# Patient Record
Sex: FEMALE | Race: WHITE | URBAN - METROPOLITAN AREA
[De-identification: names, ages, dates, MRNs, and addresses within clinical notes are randomized per-mention and may not be internally consistent; named-entity substitution may affect disease eponyms.]

---

## 2022-06-27 RX ORDER — SULFAMETHOXAZOLE AND TRIMETHOPRIM 400; 80 MG/1; MG/1
TABLET ORAL
COMMUNITY
End: 2022-10-16

## 2022-06-27 RX ORDER — ALPRAZOLAM 0.25 MG/1
TABLET ORAL
COMMUNITY
Start: 2021-12-20

## 2022-06-27 RX ORDER — FLUOXETINE HYDROCHLORIDE 40 MG/1
1 CAPSULE ORAL
COMMUNITY
End: 2022-11-04

## 2022-11-02 PROBLEM — F41.8 DEPRESSION WITH ANXIETY: Status: ACTIVE | Noted: 2022-11-02

## 2023-04-06 PROBLEM — M41.9 ACQUIRED SCOLIOSIS: Status: ACTIVE | Noted: 2023-04-06

## 2023-04-06 PROBLEM — N39.0 FREQUENT UTI: Status: ACTIVE | Noted: 2023-04-06

## 2023-04-06 PROBLEM — F32.A DEPRESSION: Status: ACTIVE | Noted: 2023-04-06

## 2023-04-06 PROBLEM — B00.1 RECURRENT COLD SORES: Status: ACTIVE | Noted: 2023-04-06

## 2023-05-17 ENCOUNTER — OFFICE VISIT (OUTPATIENT)
Dept: NEUROSURGERY | Age: 48
End: 2023-05-17
Payer: COMMERCIAL

## 2023-05-17 VITALS
OXYGEN SATURATION: 97 % | TEMPERATURE: 97.2 F | WEIGHT: 176 LBS | HEIGHT: 63 IN | SYSTOLIC BLOOD PRESSURE: 123 MMHG | DIASTOLIC BLOOD PRESSURE: 71 MMHG | HEART RATE: 79 BPM | BODY MASS INDEX: 31.18 KG/M2

## 2023-05-17 DIAGNOSIS — M54.50 CHRONIC MIDLINE LOW BACK PAIN WITHOUT SCIATICA: Primary | ICD-10-CM

## 2023-05-17 DIAGNOSIS — M41.9 SCOLIOSIS, UNSPECIFIED SCOLIOSIS TYPE, UNSPECIFIED SPINAL REGION: ICD-10-CM

## 2023-05-17 DIAGNOSIS — G89.29 CHRONIC MIDLINE LOW BACK PAIN WITHOUT SCIATICA: Primary | ICD-10-CM

## 2023-05-17 PROCEDURE — 99203 OFFICE O/P NEW LOW 30 MIN: CPT | Performed by: NURSE PRACTITIONER

## 2023-05-17 NOTE — PROGRESS NOTES
Lack of Transportation (Non-Medical): No   Physical Activity: Not on file   Stress: Not on file   Social Connections: Not on file   Intimate Partner Violence: Not on file   Housing Stability: Unknown    Unable to Pay for Housing in the Last Year: Not on file    Number of Places Lived in the Last Year: Not on file    Unstable Housing in the Last Year: No     Current Outpatient Medications   Medication Sig Dispense Refill    sulfamethoxazole-trimethoprim (BACTRIM DS;SEPTRA DS) 800-160 MG per tablet       FLUoxetine (PROZAC) 40 MG capsule Take 1 capsule by mouth daily 90 capsule 1    valACYclovir (VALTREX) 1 g tablet Take 1 tablet by mouth 2 times daily      FLUoxetine (PROZAC) 40 MG capsule TAKE 1 CAPSULE BY MOUTH EVERY DAY FOR 90 DAYS 90 capsule 0    ALPRAZolam (XANAX) 0.25 MG tablet 1 tablet Orally Twice a day for 30 days       No current facility-administered medications for this visit. Patient Active Problem List   Diagnosis    Depression with anxiety    Acquired scoliosis    Depression    Frequent UTI    Recurrent cold sores          ROS Review of Systems    Constitutional:                    No recent weight changes, fever, fatigue, sleep difficulties, loss of appetite   ENT/Mouth:  No hearing loss, No ringing in the ears, chronic sinus problem, nose bleeds,sore throat, voice change, hoarseness, swollen glands in neck, or difficulties with chewing and swallowing. Cardiovascular:  No chest pain/angina pectoris, palpitations, swelling of feet/ankles/hands, or calf pain while walking. Respiratory: No chronic or frequent coughs, spitting up blood, shortness of breath, No asthma, or wheezing.      Gastrointestinal: No a bdominal pain, heartburn, nausea, vomiting, constipation, or frequent diarrhea     Genitourinary: No frequent urination, burning or painful urination, or blood in urine     Musculoskeletal:   POS low back pain     Integument:   No rash/itching     Neurological:   Dizziness/vertigo, No

## 2023-05-18 ENCOUNTER — TELEMEDICINE (OUTPATIENT)
Dept: FAMILY MEDICINE CLINIC | Facility: CLINIC | Age: 48
End: 2023-05-18
Payer: COMMERCIAL

## 2023-05-18 DIAGNOSIS — F41.8 DEPRESSION WITH ANXIETY: Primary | ICD-10-CM

## 2023-05-18 DIAGNOSIS — M41.9 ACQUIRED SCOLIOSIS: ICD-10-CM

## 2023-05-18 PROCEDURE — G8427 DOCREV CUR MEDS BY ELIG CLIN: HCPCS | Performed by: FAMILY MEDICINE

## 2023-05-18 PROCEDURE — 99214 OFFICE O/P EST MOD 30 MIN: CPT | Performed by: FAMILY MEDICINE

## 2023-05-18 PROCEDURE — G8417 CALC BMI ABV UP PARAM F/U: HCPCS | Performed by: FAMILY MEDICINE

## 2023-05-18 PROCEDURE — 1036F TOBACCO NON-USER: CPT | Performed by: FAMILY MEDICINE

## 2023-05-18 RX ORDER — ALPRAZOLAM 0.5 MG/1
0.25 TABLET ORAL 2 TIMES DAILY PRN
Qty: 30 TABLET | Refills: 1 | Status: SHIPPED | OUTPATIENT
Start: 2023-05-18 | End: 2023-06-17

## 2023-05-18 RX ORDER — MELOXICAM 15 MG/1
15 TABLET ORAL DAILY PRN
Qty: 30 TABLET | Refills: 0 | Status: SHIPPED | OUTPATIENT
Start: 2023-05-18

## 2023-05-18 ASSESSMENT — PATIENT HEALTH QUESTIONNAIRE - PHQ9
8. MOVING OR SPEAKING SO SLOWLY THAT OTHER PEOPLE COULD HAVE NOTICED. OR THE OPPOSITE, BEING SO FIGETY OR RESTLESS THAT YOU HAVE BEEN MOVING AROUND A LOT MORE THAN USUAL: 0
4. FEELING TIRED OR HAVING LITTLE ENERGY: 0
1. LITTLE INTEREST OR PLEASURE IN DOING THINGS: 0
SUM OF ALL RESPONSES TO PHQ QUESTIONS 1-9: 0
6. FEELING BAD ABOUT YOURSELF - OR THAT YOU ARE A FAILURE OR HAVE LET YOURSELF OR YOUR FAMILY DOWN: 0
SUM OF ALL RESPONSES TO PHQ QUESTIONS 1-9: 0
SUM OF ALL RESPONSES TO PHQ9 QUESTIONS 1 & 2: 0
3. TROUBLE FALLING OR STAYING ASLEEP: 0
2. FEELING DOWN, DEPRESSED OR HOPELESS: 0
7. TROUBLE CONCENTRATING ON THINGS, SUCH AS READING THE NEWSPAPER OR WATCHING TELEVISION: 0
9. THOUGHTS THAT YOU WOULD BE BETTER OFF DEAD, OR OF HURTING YOURSELF: 0
5. POOR APPETITE OR OVEREATING: 0
SUM OF ALL RESPONSES TO PHQ QUESTIONS 1-9: 0
SUM OF ALL RESPONSES TO PHQ QUESTIONS 1-9: 0
10. IF YOU CHECKED OFF ANY PROBLEMS, HOW DIFFICULT HAVE THESE PROBLEMS MADE IT FOR YOU TO DO YOUR WORK, TAKE CARE OF THINGS AT HOME, OR GET ALONG WITH OTHER PEOPLE: 0

## 2023-05-18 ASSESSMENT — ENCOUNTER SYMPTOMS
DIARRHEA: 0
SHORTNESS OF BREATH: 0
VOMITING: 0
CONSTIPATION: 0
COUGH: 0

## 2023-05-18 NOTE — PROGRESS NOTES
Edith Adams (:  1975) is a 53 y.o. female,Established patient, here for evaluation of the following chief complaint(s):  Lower Back Pain (Has been getting worse of the past week, has been having pain for years. Wants to talk about this more.)         ASSESSMENT/PLAN:  1. Depression with anxiety  -     ALPRAZolam (XANAX) 0.5 MG tablet; Take 0.5 tablets by mouth 2 times daily as needed for Sleep for up to 30 days. Max Daily Amount: 0.5 mg, Disp-30 tablet, R-1Normal  2. Acquired scoliosis  -     meloxicam (MOBIC) 15 MG tablet; Take 1 tablet by mouth daily as needed for Pain, Disp-30 tablet, R-0Normal  Paperwork to be filled out next week. No follow-ups on file. Subjective   SUBJECTIVE/OBJECTIVE:  HPI  Chief Complaint   Patient presents with    Lower Back Pain     Has been getting worse of the past week, has been having pain for years. Wants to talk about this more. Referred to spine surgery for scoliosis. They referred her to physical therapy. Having more pain recently. Wondering what she could do about this pain. Taking advil 600mg, and 400mg about twice a day. Xanax was given in the past for anxiety, and she has not taken it in a while, but is out. Would like a refill. Needs note for work as she missed a few days due to back pain as standing is quite painful, and work is requiring me to fill out short term disability paperwork and her specialist who she saw for this refuses to do so. Review of Systems   Constitutional:  Negative for diaphoresis and unexpected weight change. HENT:  Negative for congestion. Eyes:  Negative for visual disturbance. Respiratory:  Negative for cough and shortness of breath. Cardiovascular:  Negative for chest pain. Gastrointestinal:  Negative for constipation, diarrhea and vomiting. Genitourinary:  Negative for dysuria. Neurological:  Negative for headaches.    Psychiatric/Behavioral:  Negative for dysphoric mood and sleep

## 2023-05-22 ENCOUNTER — TELEPHONE (OUTPATIENT)
Dept: NEUROSURGERY | Age: 48
End: 2023-05-22

## 2023-05-22 DIAGNOSIS — M41.9 SCOLIOSIS, UNSPECIFIED SCOLIOSIS TYPE, UNSPECIFIED SPINAL REGION: ICD-10-CM

## 2023-05-22 DIAGNOSIS — G89.29 CHRONIC MIDLINE LOW BACK PAIN WITHOUT SCIATICA: Primary | ICD-10-CM

## 2023-05-22 DIAGNOSIS — M54.50 CHRONIC MIDLINE LOW BACK PAIN WITHOUT SCIATICA: Primary | ICD-10-CM

## 2023-05-23 ENCOUNTER — HOSPITAL ENCOUNTER (OUTPATIENT)
Dept: PHYSICAL THERAPY | Age: 48
Setting detail: RECURRING SERIES
Discharge: HOME | End: 2023-05-26
Payer: COMMERCIAL

## 2023-05-23 PROCEDURE — 97161 PT EVAL LOW COMPLEX 20 MIN: CPT

## 2023-05-23 PROCEDURE — 97110 THERAPEUTIC EXERCISES: CPT

## 2023-05-23 ASSESSMENT — PAIN SCALES - GENERAL: PAINLEVEL_OUTOF10: 0

## 2023-05-23 NOTE — THERAPY EVALUATION
Nani Paul  : 1975  Primary: [address] (Commercial)  Secondary: [place] [employer]  [address]   [address]  [address] [place] [address] Way [address]  Phone: [phone]  Fax: [phone] Plan Frequency: 2 x week for 6 weeks         PT Visit Info:  Plan Frequency: 2 x week for 6 weeks  Total # of Visits to Date: 1      Visit Count:  1                OUTPATIENT PHYSICAL THERAPY:             OP NOTE TYPE: Initial Assessment 2023               Episode (Low Back Pain) Appt Desk         Treatment Diagnosis:  Generalized Muscle Weakness (M62.81)  Other Low Back Pain (M54.59)  Idiopathic scoliosis (M41.20)  Medical/Referring Diagnosis:  Chronic midline low back pain without sciatica [M54.50, G89.29]  Scoliosis, unspecified scoliosis type, unspecified spinal region [M41.9]  Referring Physician:  KELSY Ramirez CNP, MD Orders:  PT Eval and Treat   Return MD Appt:  TBD  Date of Onset:  Onset Date:  (Chronic)      Allergies:  Latex  Restrictions/Precautions:    Restrictions/Precautions: None        Medications Last Reviewed:  2023     SUBJECTIVE   History of Injury/Illness (Reason for Referral):  Patient presents with complaints of double scoliosis with complaints of stiffness, pain with standing to do dishes, or walking > 15 minutes. 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 Denies numbness/tingling, difficulty. Reports in the past feeling better after doing the cross . Saw a chiropractor once, sedentary lifestyle  Patient Stated Goal(s):  \"Eliminate back\"  Initial:     010 Post Session:     010  Past Medical History/Comorbidities:   Ms. Milan Sanchez  has no past medical history on file.   Ms. Milan Sanchez  has a past surgical history that includes  section () and

## 2023-05-26 ENCOUNTER — HOSPITAL ENCOUNTER (OUTPATIENT)
Dept: PHYSICAL THERAPY | Age: 48
Setting detail: RECURRING SERIES
End: 2023-05-26
Payer: COMMERCIAL

## 2023-05-26 NOTE — PROGRESS NOTES
Nani Paul  : 1975  Primary: [address] (Commercial)  Secondary: [place]  [address]   [address] [place] [address]  Phone: [phone]  Fax: [phone] Plan Frequency: 2 x week for 6 weeks    Plan of Care/Certification Expiration Date: 23      >PT Visit Info:  Plan Frequency: 2 x week for 6 weeks  Plan of Care/Certification Expiration Date: 23  Total # of Visits to Date: 1      Visit Count:  1    OUTPATIENT PHYSICAL THERAPY:OP NOTE TYPE: Treatment Note 2023       Episode  }Appt Desk             Treatment Diagnosis:  Generalized Muscle Weakness (M62.81)  Other Low Back Pain (M54.59)  Idiopathic scoliosis (M41.20)  Medical/Referring Diagnosis:  Chronic midline low back pain without sciatica [M54.50, G89.29]  Scoliosis, unspecified scoliosis type, unspecified spinal region [M41.9]  Referring Physician:  KELSY Ramirez CNP, MD Orders:  PT Eval and Treat   Date of Onset:  Onset Date:  (Chronic)     Allergies:   Latex  Restrictions/Precautions:  Restrictions/Precautions: None  No data recorded     Interventions Planned (Treatment may consist of any combination of the following):    Current Treatment Recommendations: Strengthening; ROM; ADL/Self-care training; Home exercise program; Patient/Caregiver education & training; Therapeutic activities     >Subjective Comments:  Patient with complaints of low back pain and stiffness worse wtih standing and walking  >Initial:     0/10>Post Session:       0/10  Medications Last Reviewed:  2023  Updated Objective Findings:  See evaluation note from today  Treatment   THERAPEUTIC EXERCISE: (23 minutes):    Exercises per grid below to improve mobility and strength. Required minimal verbal and manual cues to promote proper body alignment and promote proper body posture. Progressed complexity of movement as indicated.    Date:  23 Date:   Date:     Activity/Exercise

## 2023-06-01 ENCOUNTER — HOSPITAL ENCOUNTER (OUTPATIENT)
Dept: PHYSICAL THERAPY | Age: 48
Setting detail: RECURRING SERIES
Discharge: HOME | End: 2023-06-04
Payer: COMMERCIAL

## 2023-06-01 PROCEDURE — 97140 MANUAL THERAPY 1/> REGIONS: CPT

## 2023-06-01 PROCEDURE — 97110 THERAPEUTIC EXERCISES: CPT

## 2023-06-01 ASSESSMENT — PAIN SCALES - GENERAL: PAINLEVEL_OUTOF10: 1

## 2023-06-04 ASSESSMENT — PAIN SCALES - GENERAL: PAINLEVEL_OUTOF10: 1

## 2023-06-05 ENCOUNTER — HOSPITAL ENCOUNTER (OUTPATIENT)
Dept: PHYSICAL THERAPY | Age: 48
Setting detail: RECURRING SERIES
Discharge: HOME | End: 2023-06-08
Payer: COMMERCIAL

## 2023-06-05 PROCEDURE — 97110 THERAPEUTIC EXERCISES: CPT

## 2023-06-05 PROCEDURE — 97140 MANUAL THERAPY 1/> REGIONS: CPT

## 2023-06-05 ASSESSMENT — PAIN SCALES - GENERAL: PAINLEVEL_OUTOF10: 2

## 2023-06-05 NOTE — PROGRESS NOTES
Nani Lara Solders  : 1975  Primary: [address] (Com[address])  [place] [facility] [facility]  [address] [address]  Phone: [phone]  Fax: [phone] Plan Frequency: 2 x week for 6 weeks    Plan of Care/Certification Expiration Date: 23      >PT Visit Info:  Plan Frequency: 2 x week for 6 weeks  Plan of Care/Certification Expiration Date: 23  Total # of Visits to Date: 3  Canceled Appointment: 1      Visit Count:  3    OUTPATIENT PHYSICAL THERAPY:OP NOTE TYPE: Treatment Note 2023       Episode  }Appt Desk             Treatment Diagnosis:  Generalized Muscle Weakness (M62.81)  Other Low Back Pain (M54.59)  Idiopathic scoliosis (M41.20)  Medical/Referring Diagnosis:  Chronic midline low back pain without sciatica [M54.50, G89.29]  Scoliosis, unspecified scoliosis type, unspecified spinal region [M41.9]  Referring Physician:  KELSY Dover CNP, MD Orders:  PT Eval and Treat   Date of Onset:  Onset Date:  (Chronic)     Allergies:   Latex  Restrictions/Precautions:  Restrictions/Precautions: None  No data recorded     Interventions Planned (Treatment may consist of any combination of the following):    Current Treatment Recommendations: Strengthening; ROM; ADL/Self-care training; Home exercise program; Patient/Caregiver education & training; Therapeutic activities     >Subjective Comments:  Reports feeling much better overall, stiffness persists  >Initial:     2/10>Post Session:       1/10  Medications Last Reviewed:  2023  Updated Objective Findings:  None Today  Treatment   MANUAL THERAPY: (10 minutes): Soft tissue mobilization was utilized and necessary because of the patient's restricted motion of soft tissue. STM/MFR of lumbar  and QL, gentle thoracic PA's grade 2-3  THERAPEUTIC EXERCISE: (35 minutes):    Exercises per grid below to improve mobility and strength.   Required minimal verbal and manual

## 2023-06-07 ENCOUNTER — HOSPITAL ENCOUNTER (OUTPATIENT)
Dept: PHYSICAL THERAPY | Age: 48
Setting detail: RECURRING SERIES
Discharge: HOME | End: 2023-06-10
Payer: COMMERCIAL

## 2023-06-07 PROCEDURE — 97110 THERAPEUTIC EXERCISES: CPT

## 2023-06-07 ASSESSMENT — PAIN SCALES - GENERAL: PAINLEVEL_OUTOF10: 2

## 2023-06-07 NOTE — PROGRESS NOTES
proper body posture. Progressed complexity of movement as indicated. Date:  5/23/23 Date:  6/1/23 Date:  6/5/23 Date:  6/7/23   Activity/Exercise Parameters Parameters Parameters    SKTC 2 x 30 secs;B 2 x 30 secs 2 x 30 secs    LTR 10x hold 5 sec 10x  10x   Pelvic tilt 10 hold 5 sec 10x hold 5 sec  10x hold 5 sec   Ta bracing 10x  hold 10 sec 5x hold 20 sec     Dead bug Arms only 10x 10x arms only   Opp arm/leg raise 2 x 10   Bridge 10x  2 x 10 5x neutral  5x staggered; B 2 x 10 with ball squeeze   HEP/Patient education  5' 4'    Supine march  2 x 10 10x Reverse march 10x   Supine march w hip flex  2 x 10 alt LTB Alt march intoleg ext 10x; B    Isometric hip flex  10x hold 2  2 x 10 with ball   Child pose  Including single arm reach 5x each Including single arm reach 5x each Including single arm reach 5x each   Quadruped  -Cat/camel 10X  -Hip ext alt 10x Bird dog 10x with cues Bird dog 10x with cues   Scapular retraction  Seated 2 x 10 Supine angels dbl and single arm 10x    Lat pull down  2 x 10 BTB seated  2 x 10 BTB seated   Open book stretch   5x: B 5x; B   Wall angels   10x alt arm supine, and seated at wall 10x; single arm   Wallpush ups   5x with arms inline  5x staggered    Clams    2 x 10 w OTB; B     [employer] Portal Access Code: [email or web address]  URL: [email or web address]  Date: 06/05/2023  Prepared by: Luiz Guillens    Exercises  - Hooklying Single Knee to Chest  - 2 x daily - 7 x weekly - 2-3 reps - 20-30 hold  - Supine Lower Trunk Rotation  - 2 x daily - 7 x weekly - 10 reps - 5 hold  - Supine Posterior Pelvic Tilt  - 2 x daily - 7 x weekly - 10 reps - 5 hold  - Supine Hamstring Stretch  - 1 x daily - 7 x weekly - 3 reps - 20-30 hold  - Child's Pose Stretch  - 1 x daily - 7 x weekly - 3 reps - 10 hold  - Child's Pose with Sidebending  - 1 x daily - 7 x weekly - 3 reps - 10 hold  - Cat Cow to Child's Pose  - 1 x daily - 7 x weekly - 3 sets - 10 reps  - Supine Transversus Abdominis

## 2023-06-12 ENCOUNTER — HOSPITAL ENCOUNTER (OUTPATIENT)
Dept: PHYSICAL THERAPY | Age: 48
Setting detail: RECURRING SERIES
End: 2023-06-12
Payer: MEDICAID

## 2023-06-14 ENCOUNTER — APPOINTMENT (OUTPATIENT)
Dept: PHYSICAL THERAPY | Age: 48
End: 2023-06-14
Payer: MEDICAID

## 2023-06-19 ENCOUNTER — HOSPITAL ENCOUNTER (OUTPATIENT)
Dept: PHYSICAL THERAPY | Age: 48
Setting detail: RECURRING SERIES
End: 2023-06-19
Payer: MEDICAID

## 2023-06-21 ENCOUNTER — APPOINTMENT (OUTPATIENT)
Dept: PHYSICAL THERAPY | Age: 48
End: 2023-06-21
Payer: MEDICAID

## 2023-06-26 ENCOUNTER — APPOINTMENT (OUTPATIENT)
Dept: PHYSICAL THERAPY | Age: 48
End: 2023-06-26
Payer: MEDICAID

## 2023-06-28 ENCOUNTER — APPOINTMENT (OUTPATIENT)
Dept: PHYSICAL THERAPY | Age: 48
End: 2023-06-28
Payer: MEDICAID

## 2023-10-05 DIAGNOSIS — F32.A DEPRESSION, UNSPECIFIED DEPRESSION TYPE: ICD-10-CM

## 2023-10-05 RX ORDER — FLUOXETINE HYDROCHLORIDE 40 MG/1
40 CAPSULE ORAL DAILY
Qty: 30 CAPSULE | Refills: 0 | Status: SHIPPED | OUTPATIENT
Start: 2023-10-05 | End: 2024-04-02

## 2023-10-05 NOTE — TELEPHONE ENCOUNTER
Pt called requesting for a temporary refill for FLUoxetine (PROZAC) 40 MG capsule until her appt on 10/16.      Would like it sent to [facility] at [address] [facility] [place] [address]    Identification number [id number]

## 2023-10-16 DIAGNOSIS — F32.A DEPRESSION, UNSPECIFIED DEPRESSION TYPE: ICD-10-CM

## 2023-10-16 RX ORDER — FLUOXETINE HYDROCHLORIDE 40 MG/1
40 CAPSULE ORAL DAILY
Qty: 30 CAPSULE | Refills: 0 | Status: SHIPPED | OUTPATIENT
Start: 2023-10-16 | End: 2024-04-13

## 2023-10-16 NOTE — TELEPHONE ENCOUNTER
----- Message from Krista Claude sent at 10/16/2023  9:14 AM EDT -----  Subject: Refill Request    QUESTIONS  Name of Medication? FLUoxetine (PROZAC) 40 MG capsule  Patient-reported dosage and instructions? AS INSTRUCTED  How many days do you have left? 8  Preferred Pharmacy? [address] [place] [address] phone number (if available)? [phone]  Additional Information for Provider? Pt will run out of this medication   before appt on 11/09; can pt get a partial refill until appt?; PLEASE   ADVISE  ---------------------------------------------------------------------------  --------------  CALL BACK INFO  What is the best way for the office to contact you? OK to leave message on   voicemail  Preferred Call Back Phone Number? [phone]  ---------------------------------------------------------------------------  --------------  SCRIPT ANSWERS  Relationship to Patient?  Self

## 2023-11-28 ENCOUNTER — TELEMEDICINE (OUTPATIENT)
Dept: FAMILY MEDICINE CLINIC | Facility: CLINIC | Age: 48
End: 2023-11-28

## 2023-11-28 DIAGNOSIS — M54.9 CHRONIC BACK PAIN, UNSPECIFIED BACK LOCATION, UNSPECIFIED BACK PAIN LATERALITY: ICD-10-CM

## 2023-11-28 DIAGNOSIS — F32.A DEPRESSION, UNSPECIFIED DEPRESSION TYPE: ICD-10-CM

## 2023-11-28 DIAGNOSIS — G89.29 CHRONIC BACK PAIN, UNSPECIFIED BACK LOCATION, UNSPECIFIED BACK PAIN LATERALITY: ICD-10-CM

## 2023-11-28 DIAGNOSIS — F41.8 DEPRESSION WITH ANXIETY: Primary | ICD-10-CM

## 2023-11-28 DIAGNOSIS — M41.9 ACQUIRED SCOLIOSIS: ICD-10-CM

## 2023-11-28 PROCEDURE — 99213 OFFICE O/P EST LOW 20 MIN: CPT | Performed by: FAMILY MEDICINE

## 2023-11-28 RX ORDER — ALPRAZOLAM 0.25 MG/1
0.25 TABLET ORAL 2 TIMES DAILY
Qty: 60 TABLET | Refills: 0 | Status: SHIPPED | OUTPATIENT
Start: 2023-11-28 | End: 2023-12-28

## 2023-11-28 RX ORDER — FLUOXETINE HYDROCHLORIDE 40 MG/1
40 CAPSULE ORAL DAILY
Qty: 90 CAPSULE | Refills: 1 | Status: SHIPPED | OUTPATIENT
Start: 2023-11-28 | End: 2024-05-26

## 2023-11-28 ASSESSMENT — ENCOUNTER SYMPTOMS
CONSTIPATION: 0
SHORTNESS OF BREATH: 0
DIARRHEA: 0
VOMITING: 0
COUGH: 0

## 2023-11-28 NOTE — PROGRESS NOTES
patient is not nervous/anxious. Objective   Physical Exam  Constitutional:       General: She is not in acute distress. Appearance: Normal appearance. HENT:      Head: Normocephalic. Eyes:      Extraocular Movements: Extraocular movements intact. Pulmonary:      Effort: Pulmonary effort is normal.   Musculoskeletal:      Cervical back: Normal range of motion. Neurological:      General: No focal deficit present. Mental Status: She is alert and oriented to person, place, and time. Psychiatric:         Mood and Affect: Mood normal.         Behavior: Behavior normal.         Thought Content: Thought content normal.          Nani Finch, was evaluated through a synchronous (real-time) audio-video encounter. The patient (or guardian if applicable) is aware that this is a billable service, which includes applicable co-pays. This Virtual Visit was conducted with patient's (and/or legal guardian's) consent. Patient identification was verified, and a caregiver was present when appropriate. The patient was located at Home: [address] [place] [address]  Provider was located at Home ([address] [facility]): Kathyrn Rubinstein, MD on 11/28/2023 at 8:47 AM    An electronic signature was used to authenticate this note. An electronic signature was used to authenticate this note.     --Natalya Elkins MD

## 2024-02-07 ENCOUNTER — OFFICE VISIT (OUTPATIENT)
Dept: FAMILY MEDICINE CLINIC | Facility: CLINIC | Age: 49
End: 2024-02-07
Payer: COMMERCIAL

## 2024-02-07 VITALS
SYSTOLIC BLOOD PRESSURE: 120 MMHG | RESPIRATION RATE: 19 BRPM | BODY MASS INDEX: 32.78 KG/M2 | HEIGHT: 63 IN | DIASTOLIC BLOOD PRESSURE: 66 MMHG | WEIGHT: 185 LBS | TEMPERATURE: 98.1 F | HEART RATE: 82 BPM | OXYGEN SATURATION: 98 %

## 2024-02-07 DIAGNOSIS — N90.89 LESION OF VULVA: Primary | ICD-10-CM

## 2024-02-07 DIAGNOSIS — L82.1 SEBORRHEIC KERATOSIS: ICD-10-CM

## 2024-02-07 PROCEDURE — 99214 OFFICE O/P EST MOD 30 MIN: CPT | Performed by: FAMILY MEDICINE

## 2024-02-07 ASSESSMENT — ENCOUNTER SYMPTOMS
CONSTIPATION: 0
COUGH: 0
VOMITING: 0
SHORTNESS OF BREATH: 0
DIARRHEA: 0

## 2024-02-07 ASSESSMENT — PATIENT HEALTH QUESTIONNAIRE - PHQ9
6. FEELING BAD ABOUT YOURSELF - OR THAT YOU ARE A FAILURE OR HAVE LET YOURSELF OR YOUR FAMILY DOWN: 0
2. FEELING DOWN, DEPRESSED OR HOPELESS: 0
SUM OF ALL RESPONSES TO PHQ QUESTIONS 1-9: 0
4. FEELING TIRED OR HAVING LITTLE ENERGY: 0
SUM OF ALL RESPONSES TO PHQ QUESTIONS 1-9: 0
SUM OF ALL RESPONSES TO PHQ9 QUESTIONS 1 & 2: 0
SUM OF ALL RESPONSES TO PHQ QUESTIONS 1-9: 0
9. THOUGHTS THAT YOU WOULD BE BETTER OFF DEAD, OR OF HURTING YOURSELF: 0
3. TROUBLE FALLING OR STAYING ASLEEP: 0
7. TROUBLE CONCENTRATING ON THINGS, SUCH AS READING THE NEWSPAPER OR WATCHING TELEVISION: 0
10. IF YOU CHECKED OFF ANY PROBLEMS, HOW DIFFICULT HAVE THESE PROBLEMS MADE IT FOR YOU TO DO YOUR WORK, TAKE CARE OF THINGS AT HOME, OR GET ALONG WITH OTHER PEOPLE: 0
8. MOVING OR SPEAKING SO SLOWLY THAT OTHER PEOPLE COULD HAVE NOTICED. OR THE OPPOSITE, BEING SO FIGETY OR RESTLESS THAT YOU HAVE BEEN MOVING AROUND A LOT MORE THAN USUAL: 0
1. LITTLE INTEREST OR PLEASURE IN DOING THINGS: 0
SUM OF ALL RESPONSES TO PHQ QUESTIONS 1-9: 0
5. POOR APPETITE OR OVEREATING: 0

## 2024-02-07 NOTE — PROGRESS NOTES
Nani Thakur (:  1975) is a 48 y.o. female,Established patient, here for evaluation of the following chief complaint(s):  Nodule (In Vaginal area. Felt it about 6 months ago, and states that it is getting bigger. Not sore, and states that it doesn't look infected.) and Other (Has been over 10 years since the last Tdap shot. Has yet to have a Colon screening.)         ASSESSMENT/PLAN:  1. Lesion of vulva  2. Seborrheic keratosis    Cryotherapy in 3 freeze/thaw cycles to vulvar lesion, but warned to return should lesion not resolve or should it return.  Would then refer to gyn for further eval w/ biopsy.   Also performed cryotherapy In 3 freeze/thaw cycles to the leg lesion.     No follow-ups on file.         Subjective   SUBJECTIVE/OBJECTIVE:  HPI  Chief Complaint   Patient presents with    Nodule     In Vaginal area. Felt it about 6 months ago, and states that it is getting bigger. Not sore, and states that it doesn't look infected.    Other     Has been over 10 years since the last Tdap shot. Has yet to have a Colon screening.       Review of Systems   Constitutional:  Negative for diaphoresis and unexpected weight change.   HENT:  Negative for congestion.    Eyes:  Negative for visual disturbance.   Respiratory:  Negative for cough and shortness of breath.    Cardiovascular:  Negative for chest pain.   Gastrointestinal:  Negative for constipation, diarrhea and vomiting.   Genitourinary:  Negative for dysuria.   Neurological:  Negative for headaches.   Psychiatric/Behavioral:  Negative for dysphoric mood and sleep disturbance. The patient is not nervous/anxious.           Objective   Physical Exam  Exam conducted with a chaperone present.   Genitourinary:     General: Normal vulva.      Exam position: Lithotomy position.      Pubic Area: No rash.       Labia:         Right: No rash, tenderness or lesion.         Left: No rash, tenderness or lesion.       Vagina: Normal. No vaginal discharge,

## 2024-06-03 ENCOUNTER — TELEPHONE (OUTPATIENT)
Dept: FAMILY MEDICINE CLINIC | Facility: CLINIC | Age: 49
End: 2024-06-03

## 2024-06-03 DIAGNOSIS — F32.A DEPRESSION, UNSPECIFIED DEPRESSION TYPE: ICD-10-CM

## 2024-06-03 NOTE — TELEPHONE ENCOUNTER
----- Message from Roxie Pinto Donovan sent at 6/3/2024  3:06 PM EDT -----  Regarding: [phone]  [facility] Appointment Request    Patient needs appointment for [facility] Appointment Type: Existing Condition Follow Up.    Reason for Appointment Request: No appointments available during search  --------------------------------------------------------------------------------------------------------------------------    Relationship to Patient: Self     Call Back Information: OK to leave message on voicemail  Preferred Call Back Number: Phone [phone]

## 2024-06-03 NOTE — TELEPHONE ENCOUNTER
I called the patient and spoke to her about this. While on the phone she stated that she needed some of her Fluoxetine to hold her over until she can talk about it on the 6-7-24 visit.

## 2024-06-04 RX ORDER — FLUOXETINE HYDROCHLORIDE 40 MG/1
40 CAPSULE ORAL DAILY
Qty: 5 CAPSULE | Refills: 0 | Status: SHIPPED | OUTPATIENT
Start: 2024-06-04 | End: 2024-06-07 | Stop reason: SDUPTHER

## 2024-06-06 SDOH — ECONOMIC STABILITY: HOUSING INSECURITY
IN THE LAST 12 MONTHS, WAS THERE A TIME WHEN YOU DID NOT HAVE A STEADY PLACE TO SLEEP OR SLEPT IN A SHELTER (INCLUDING NOW)?: NO

## 2024-06-06 SDOH — ECONOMIC STABILITY: FOOD INSECURITY: WITHIN THE PAST 12 MONTHS, YOU WORRIED THAT YOUR FOOD WOULD RUN OUT BEFORE YOU GOT MONEY TO BUY MORE.: NEVER TRUE

## 2024-06-06 SDOH — ECONOMIC STABILITY: FOOD INSECURITY: WITHIN THE PAST 12 MONTHS, THE FOOD YOU BOUGHT JUST DIDN'T LAST AND YOU DIDN'T HAVE MONEY TO GET MORE.: NEVER TRUE

## 2024-06-06 SDOH — ECONOMIC STABILITY: TRANSPORTATION INSECURITY
IN THE PAST 12 MONTHS, HAS LACK OF TRANSPORTATION KEPT YOU FROM MEETINGS, WORK, OR FROM GETTING THINGS NEEDED FOR DAILY LIVING?: NO

## 2024-06-06 SDOH — ECONOMIC STABILITY: INCOME INSECURITY: HOW HARD IS IT FOR YOU TO PAY FOR THE VERY BASICS LIKE FOOD, HOUSING, MEDICAL CARE, AND HEATING?: NOT VERY HARD

## 2024-06-07 ENCOUNTER — TELEMEDICINE (OUTPATIENT)
Dept: FAMILY MEDICINE CLINIC | Facility: CLINIC | Age: 49
End: 2024-06-07
Payer: COMMERCIAL

## 2024-06-07 DIAGNOSIS — N90.89 LESION OF VULVA: ICD-10-CM

## 2024-06-07 DIAGNOSIS — Z72.51 HIGH RISK HETEROSEXUAL BEHAVIOR: ICD-10-CM

## 2024-06-07 DIAGNOSIS — F32.A DEPRESSION, UNSPECIFIED DEPRESSION TYPE: ICD-10-CM

## 2024-06-07 DIAGNOSIS — B00.1 RECURRENT COLD SORES: Primary | ICD-10-CM

## 2024-06-07 PROCEDURE — 99214 OFFICE O/P EST MOD 30 MIN: CPT | Performed by: FAMILY MEDICINE

## 2024-06-07 RX ORDER — BUPROPION HYDROCHLORIDE 150 MG/1
150 TABLET ORAL EVERY MORNING
Qty: 30 TABLET | Refills: 1 | Status: SHIPPED | OUTPATIENT
Start: 2024-06-07

## 2024-06-07 RX ORDER — VALACYCLOVIR HYDROCHLORIDE 1 G/1
1000 TABLET, FILM COATED ORAL 2 TIMES DAILY
Qty: 30 TABLET | Refills: 1 | Status: SHIPPED | OUTPATIENT
Start: 2024-06-07

## 2024-06-07 RX ORDER — FLUOXETINE HYDROCHLORIDE 40 MG/1
40 CAPSULE ORAL DAILY
Qty: 90 CAPSULE | Refills: 1 | Status: SHIPPED | OUTPATIENT
Start: 2024-06-07 | End: 2024-12-04

## 2024-06-07 ASSESSMENT — ENCOUNTER SYMPTOMS
VOMITING: 0
SHORTNESS OF BREATH: 0
DIARRHEA: 0
COUGH: 0
CONSTIPATION: 0

## 2024-06-07 NOTE — PROGRESS NOTES
Nani Thakur (:  1975) is a Established patient, here for evaluation of the following:    Assessment & Plan   Below is the assessment and plan developed based on review of pertinent history, physical exam, labs, studies, and medications.  1. Recurrent cold sores  -     valACYclovir (VALTREX) 1 g tablet; Take 1 tablet by mouth 2 times daily, Disp-30 tablet, R-1Normal  2. Depression, unspecified depression type  -     FLUoxetine (PROZAC) 40 MG capsule; Take 1 capsule by mouth daily, Disp-90 capsule, R-1Normal  -     buPROPion (WELLBUTRIN XL) 150 MG extended release tablet; Take 1 tablet by mouth every morning, Disp-30 tablet, R-1Normal  3. High risk heterosexual behavior  -     Chlamydia, Gonorrhea, Trichomoniasis; Future  -     HIV 1/2 Ag/Ab, 4TH Generation,W Rflx Confirm; Future  -     T. pallidum Ab; Future  -     Hepatitis C Antibody; Future  4. Lesion of vulva  -     [facility]    No follow-ups on file.       Subjective   HPI  Chief Complaint   Patient presents with    Follow-up     On Depression and medication related to this.    Exposure to STD     Had unprotected sex and wants t see about getting tested for STD's   Not sure that prozac is working as well anymore as she has been on it for a long time.  Thinks she might need to change it.  Switching jobs right now and going through a lot of personal issues.  Difficult to get up in the morning and do what she needs to do.     Also has gained weight and would like to try something to help with this. Feels she eats well but can't seem to budge the scale.     Has a bump in her vulvar area which I froze and it came back.  Would like to see me again for retreatment.     Had unprotected sex with one male partner and she would like to be tested for stis. She has noticed a grey thick discharge and a little burning and itching on the external and internal vulvar and vaginal tissues. Has had some sweating in her sleep. Feels

## 2024-06-11 DIAGNOSIS — Z72.51 HIGH RISK HETEROSEXUAL BEHAVIOR: ICD-10-CM

## 2024-06-11 LAB
HCV AB SER QL: NONREACTIVE
HIV 1+2 AB+HIV1 P24 AG SERPL QL IA: NONREACTIVE
HIV 1/2 RESULT COMMENT: NORMAL

## 2024-06-12 LAB — T PALLIDUM AB SER QL IA: NON REACTIVE

## 2024-06-14 LAB
C TRACH RRNA SPEC QL NAA+PROBE: NEGATIVE
N GONORRHOEA RRNA SPEC QL NAA+PROBE: NEGATIVE
SPECIMEN SOURCE: NORMAL
T VAGINALIS RRNA SPEC QL NAA+PROBE: NEGATIVE

## 2024-06-26 ENCOUNTER — OFFICE VISIT (OUTPATIENT)
Dept: FAMILY MEDICINE CLINIC | Facility: CLINIC | Age: 49
End: 2024-06-26
Payer: COMMERCIAL

## 2024-06-26 VITALS
DIASTOLIC BLOOD PRESSURE: 72 MMHG | BODY MASS INDEX: 30.37 KG/M2 | OXYGEN SATURATION: 96 % | HEART RATE: 90 BPM | HEIGHT: 63 IN | WEIGHT: 171.4 LBS | SYSTOLIC BLOOD PRESSURE: 118 MMHG | RESPIRATION RATE: 12 BRPM

## 2024-06-26 DIAGNOSIS — L30.9 DERMATITIS OF FACE: ICD-10-CM

## 2024-06-26 DIAGNOSIS — J34.0 FURUNCLE OF NOSE: Primary | ICD-10-CM

## 2024-06-26 PROCEDURE — 99214 OFFICE O/P EST MOD 30 MIN: CPT | Performed by: FAMILY MEDICINE

## 2024-06-26 RX ORDER — TRIAMCINOLONE ACETONIDE 0.25 MG/G
CREAM TOPICAL
Qty: 15 G | Refills: 0 | Status: SHIPPED | OUTPATIENT
Start: 2024-06-26

## 2024-06-26 RX ORDER — ALPRAZOLAM 0.25 MG/1
0.25 TABLET ORAL NIGHTLY PRN
COMMUNITY

## 2024-06-26 ASSESSMENT — ENCOUNTER SYMPTOMS
SHORTNESS OF BREATH: 0
ABDOMINAL PAIN: 0
BLOOD IN STOOL: 0
CHEST TIGHTNESS: 0

## 2024-06-26 NOTE — PROGRESS NOTES
[facility]  _______________________________________  MD Alyse Espitia, EFREN Gutierrez, MD Jessie Flores MD    [address] [place] [address]  Phone: [phone]  Fax: [phone]      Nani Thakur (:  1975) is a 49 y.o. female,Established patient, here for evaluation of the following chief complaint(s):  Rash (C/O rash on the side of her nose, onset 2 weeks, itches )      Assessment & Plan   1. Furuncle of nose  Will do bactroban for this. Call if no better in 2 weeks.   - mupirocin (BACTROBAN) 2 % ointment; Apply topically 3 times daily.  Dispense: 15 g; Refill: 0    2. Dermatitis of face  Will try low dose kenalog on the affected face to keep her from scratching her sleep. Use no more than 10 days, call if not better by then.   - triamcinolone (KENALOG) 0.025 % cream; Apply Topically in thin layer to itchy skin twice a day as needed.  Dispense: 15 g; Refill: 0      Return if symptoms worsen or fail to improve.       Subjective     Pt with hx of recurrent oral HSV who is on Valtrex PRN is here with facial rash:    Has itching and redness around the anterior nares BL, will sometimes crust over.     Vitals:    24 1412   BP: 118/72   Pulse: 90   Resp: 12   SpO2: 96%         Review of Systems   Constitutional:  Negative for chills and fever.   Respiratory:  Negative for chest tightness and shortness of breath.    Cardiovascular:  Negative for chest pain.   Gastrointestinal:  Negative for abdominal pain and blood in stool.   Genitourinary:  Negative for hematuria.   Skin:  Positive for rash.   Neurological:  Negative for syncope.          Objective   Physical Exam  Vitals and nursing note reviewed.   Constitutional:       Appearance: Normal appearance.   HENT:      Head: Normocephalic and atraumatic.      Right Ear: External ear normal.      Left Ear: External ear normal.      Nose:      Comments: There is a crusty

## 2024-06-30 ENCOUNTER — PATIENT MESSAGE (OUTPATIENT)
Dept: FAMILY MEDICINE CLINIC | Facility: CLINIC | Age: 49
End: 2024-06-30

## 2024-07-09 ENCOUNTER — TELEPHONE (OUTPATIENT)
Dept: FAMILY MEDICINE CLINIC | Facility: CLINIC | Age: 49
End: 2024-07-09

## 2024-07-09 NOTE — TELEPHONE ENCOUNTER
----- Message from Eliana Arias MA sent at 7/8/2024  4:37 PM EDT -----  Regarding: FW: Topical antibiotic not working  Contact: [phone]  When ever you guys have time. If you can find a spot for her.   ----- Message -----  From: Nani Thakur \"Beverly\"  Sent: 7/8/2024   4:20 PM EDT  To: [facility] Clinical Staff  Subject: Topical antibiotic not working                   I called and apparently the phone system is not working please call me [phone]

## 2024-07-12 ENCOUNTER — TELEMEDICINE (OUTPATIENT)
Dept: FAMILY MEDICINE CLINIC | Facility: CLINIC | Age: 49
End: 2024-07-12
Payer: COMMERCIAL

## 2024-07-12 DIAGNOSIS — E66.09 CLASS 1 OBESITY DUE TO EXCESS CALORIES WITHOUT SERIOUS COMORBIDITY WITH BODY MASS INDEX (BMI) OF 30.0 TO 30.9 IN ADULT: ICD-10-CM

## 2024-07-12 DIAGNOSIS — L71.0 PERIORAL DERMATITIS: Primary | ICD-10-CM

## 2024-07-12 PROCEDURE — 99214 OFFICE O/P EST MOD 30 MIN: CPT | Performed by: FAMILY MEDICINE

## 2024-07-12 RX ORDER — METRONIDAZOLE 7.5 MG/G
GEL TOPICAL
Qty: 45 G | Refills: 2 | Status: SHIPPED | OUTPATIENT
Start: 2024-07-12

## 2024-07-12 RX ORDER — TIRZEPATIDE 2.5 MG/.5ML
2.5 INJECTION, SOLUTION SUBCUTANEOUS
Qty: 2 ML | Refills: 2 | Status: SHIPPED | OUTPATIENT
Start: 2024-07-12

## 2024-07-12 NOTE — PROGRESS NOTES
Nani Thakur (:  1975) is a Established patient, here for evaluation of the following:    Assessment & Plan   Below is the assessment and plan developed based on review of pertinent history, physical exam, labs, studies, and medications.  1. Perioral dermatitis  -     metroNIDAZOLE (METROGEL) 0.75 % gel; Apply topically 2 times daily., Disp-45 g, R-2, Normal  2. Class 1 obesity due to excess calories without serious comorbidity with body mass index (BMI) of 30.0 to 30.9 in adult  -     Tirzepatide-Weight Management (ZEPBOUND) 2.5 MG/0.5ML SOAJ; Inject 2.5 mg into the skin every 7 days, Disp-2 mL, R-2Normal    No follow-ups on file.       Subjective   HPI  Chief Complaint   Patient presents with    Rash     Has a rash on her nose and going down her face. She was given Mupirocin ointment, witch is not help.  Has had this going on for 3 weeks know. States that the medication that was given made it worse.    Medication Adjustment     Wants to talk about the Wellbutrin as well, states that it did not help her.   Perioral dermatitis around her nose and lips.  Has seen dermatologist.     She is on prozac 40mg daily and we had added wellbutrin 150 which she feels didn't help at all.  She is undergoing a move and job change.  Frustrated about her weight.      Review of Systems       Objective     Physical Exam  [INSTRUCTIONS:  \"[x]\" Indicates a positive item  \"[]\" Indicates a negative item  -- DELETE ALL ITEMS NOT EXAMINED]    Constitutional: [x] Appears well-developed and well-nourished [x] No apparent distress      [] Abnormal -     Mental status: [x] Alert and awake  [x] Oriented to person/place/time [x] Able to follow commands    [] Abnormal -     Eyes:   EOM    [x]  Normal    [] Abnormal -   Sclera  [x]  Normal    [] Abnormal -          Discharge [x]  None visible   [] Abnormal -     HENT: [x] Normocephalic, atraumatic  [] Abnormal -   [x] Mouth/Throat: Mucous membranes are moist    External Ears [x]

## 2024-07-18 ENCOUNTER — PATIENT MESSAGE (OUTPATIENT)
Dept: FAMILY MEDICINE CLINIC | Facility: CLINIC | Age: 49
End: 2024-07-18

## 2024-07-18 DIAGNOSIS — E66.09 CLASS 1 OBESITY DUE TO EXCESS CALORIES WITHOUT SERIOUS COMORBIDITY WITH BODY MASS INDEX (BMI) OF 30.0 TO 30.9 IN ADULT: Primary | ICD-10-CM

## 2024-07-18 NOTE — TELEPHONE ENCOUNTER
From: Nani Thakur  To: Dr. Jessie Craig  Sent: 7/18/2024 3:21 PM EDT  Subject: Zepbound    I can’t afford that right now, I be able to soon but could you prescribe phentermine? I’ve had that ove before in 2021

## 2024-07-19 RX ORDER — PHENTERMINE HYDROCHLORIDE 37.5 MG/1
18.75 TABLET ORAL 2 TIMES DAILY
Qty: 30 TABLET | Refills: 2 | Status: SHIPPED | OUTPATIENT
Start: 2024-07-19 | End: 2024-10-17

## 2024-08-04 ENCOUNTER — PATIENT MESSAGE (OUTPATIENT)
Dept: FAMILY MEDICINE CLINIC | Facility: CLINIC | Age: 49
End: 2024-08-04

## 2024-08-04 DIAGNOSIS — L71.0 PERIORAL DERMATITIS: Primary | ICD-10-CM

## 2024-08-04 DIAGNOSIS — F41.8 DEPRESSION WITH ANXIETY: ICD-10-CM

## 2024-08-06 RX ORDER — MINOCYCLINE HYDROCHLORIDE 100 MG/1
100 CAPSULE ORAL DAILY
Qty: 30 CAPSULE | Refills: 1 | Status: SHIPPED | OUTPATIENT
Start: 2024-08-06

## 2024-08-06 NOTE — TELEPHONE ENCOUNTER
From: Nani Thakur  To: Dr. Jessie Craig  Sent: 8/4/2024 8:48 AM EDT  Subject: Dermatitis on face    It is not going away, could you please prescribe the oral antibiotic?

## 2024-08-07 ENCOUNTER — TELEPHONE (OUTPATIENT)
Dept: FAMILY MEDICINE CLINIC | Facility: CLINIC | Age: 49
End: 2024-08-07

## 2024-08-07 RX ORDER — ALPRAZOLAM 0.25 MG/1
0.25 TABLET ORAL NIGHTLY PRN
Qty: 30 TABLET | Refills: 0 | Status: SHIPPED | OUTPATIENT
Start: 2024-08-07 | End: 2024-09-06

## 2024-08-07 NOTE — TELEPHONE ENCOUNTER
----- Message from Nani Thakur sent at 8/6/2024  5:44 PM EDT -----  Regarding: Dermatitis on face  Contact: [phone]  Thank you! Could I get Xanax refilled as well? I only it for PMS but I’m running a little low and I am moving to [place] so I’ll need time to establish a new dr.  Thank you, you’ve been great I’ve recommended you to friends!

## 2024-08-07 NOTE — TELEPHONE ENCOUNTER
Last OV 11/28/23  Last refill 11/28/23  I have reviewed the patient’s controlled substance prescription history, as maintained in the [place] prescription monitoring program, so that the prescription(s) for a  controlled substance can be given.

## 2024-12-13 ENCOUNTER — TELEPHONE (OUTPATIENT)
Dept: FAMILY MEDICINE CLINIC | Facility: CLINIC | Age: 49
End: 2024-12-13

## 2024-12-13 DIAGNOSIS — F32.A DEPRESSION, UNSPECIFIED DEPRESSION TYPE: ICD-10-CM

## 2024-12-13 RX ORDER — FLUOXETINE 40 MG/1
40 CAPSULE ORAL DAILY
Qty: 30 CAPSULE | Refills: 0 | Status: SHIPPED | OUTPATIENT
Start: 2024-12-13 | End: 2025-06-11

## 2024-12-13 NOTE — TELEPHONE ENCOUNTER
Patient calling in requesting a medication refill for :       FLUoxetine (PROZAC) 40 MG capsule     Patient is requesting that this go to her pharmacy:     [facility]    [address], [place] [address] [phone]    Patients last appt: 07/12/2024    Upcoming appt: 01/10/2025- pt stated this was the soonest available she could get    Patient states she has enough to get through the weekend but not enough to get through until her next appointment on 01/10/2025    Patient would like to know if PCP can give her either a short term supply or 1 month refill until her next appt coming up

## 2025-01-10 ENCOUNTER — TELEMEDICINE (OUTPATIENT)
Dept: FAMILY MEDICINE CLINIC | Facility: CLINIC | Age: 50
End: 2025-01-10
Payer: COMMERCIAL

## 2025-01-10 DIAGNOSIS — E66.09 CLASS 1 OBESITY DUE TO EXCESS CALORIES WITHOUT SERIOUS COMORBIDITY WITH BODY MASS INDEX (BMI) OF 30.0 TO 30.9 IN ADULT: ICD-10-CM

## 2025-01-10 DIAGNOSIS — F41.0 PANIC ATTACKS: ICD-10-CM

## 2025-01-10 DIAGNOSIS — F32.A DEPRESSION, UNSPECIFIED DEPRESSION TYPE: Primary | ICD-10-CM

## 2025-01-10 DIAGNOSIS — E66.811 CLASS 1 OBESITY DUE TO EXCESS CALORIES WITHOUT SERIOUS COMORBIDITY WITH BODY MASS INDEX (BMI) OF 30.0 TO 30.9 IN ADULT: ICD-10-CM

## 2025-01-10 PROCEDURE — 99214 OFFICE O/P EST MOD 30 MIN: CPT | Performed by: FAMILY MEDICINE

## 2025-01-10 RX ORDER — ALPRAZOLAM 0.5 MG
0.5 TABLET ORAL NIGHTLY PRN
Qty: 30 TABLET | Refills: 0 | Status: SHIPPED | OUTPATIENT
Start: 2025-01-10 | End: 2025-02-09

## 2025-01-10 RX ORDER — TIRZEPATIDE 2.5 MG/.5ML
2.5 INJECTION, SOLUTION SUBCUTANEOUS
Qty: 2 ML | Refills: 2 | Status: SHIPPED | OUTPATIENT
Start: 2025-01-10

## 2025-01-10 RX ORDER — VENLAFAXINE HYDROCHLORIDE 37.5 MG/1
CAPSULE, EXTENDED RELEASE ORAL
Qty: 180 CAPSULE | Refills: 0 | Status: SHIPPED | OUTPATIENT
Start: 2025-01-10

## 2025-01-10 SDOH — ECONOMIC STABILITY: FOOD INSECURITY: WITHIN THE PAST 12 MONTHS, YOU WORRIED THAT YOUR FOOD WOULD RUN OUT BEFORE YOU GOT MONEY TO BUY MORE.: NEVER TRUE

## 2025-01-10 SDOH — ECONOMIC STABILITY: FOOD INSECURITY: WITHIN THE PAST 12 MONTHS, THE FOOD YOU BOUGHT JUST DIDN'T LAST AND YOU DIDN'T HAVE MONEY TO GET MORE.: NEVER TRUE

## 2025-01-10 ASSESSMENT — ENCOUNTER SYMPTOMS
DIARRHEA: 0
SHORTNESS OF BREATH: 0
CONSTIPATION: 0

## 2025-01-10 ASSESSMENT — PATIENT HEALTH QUESTIONNAIRE - PHQ9
SUM OF ALL RESPONSES TO PHQ QUESTIONS 1-9: 0
SUM OF ALL RESPONSES TO PHQ9 QUESTIONS 1 & 2: 0
SUM OF ALL RESPONSES TO PHQ QUESTIONS 1-9: 0
8. MOVING OR SPEAKING SO SLOWLY THAT OTHER PEOPLE COULD HAVE NOTICED. OR THE OPPOSITE, BEING SO FIGETY OR RESTLESS THAT YOU HAVE BEEN MOVING AROUND A LOT MORE THAN USUAL: NOT AT ALL
5. POOR APPETITE OR OVEREATING: NOT AT ALL
SUM OF ALL RESPONSES TO PHQ QUESTIONS 1-9: 0
1. LITTLE INTEREST OR PLEASURE IN DOING THINGS: NOT AT ALL
7. TROUBLE CONCENTRATING ON THINGS, SUCH AS READING THE NEWSPAPER OR WATCHING TELEVISION: NOT AT ALL
6. FEELING BAD ABOUT YOURSELF - OR THAT YOU ARE A FAILURE OR HAVE LET YOURSELF OR YOUR FAMILY DOWN: NOT AT ALL
SUM OF ALL RESPONSES TO PHQ QUESTIONS 1-9: 0
2. FEELING DOWN, DEPRESSED OR HOPELESS: NOT AT ALL
4. FEELING TIRED OR HAVING LITTLE ENERGY: NOT AT ALL
9. THOUGHTS THAT YOU WOULD BE BETTER OFF DEAD, OR OF HURTING YOURSELF: NOT AT ALL
3. TROUBLE FALLING OR STAYING ASLEEP: NOT AT ALL
10. IF YOU CHECKED OFF ANY PROBLEMS, HOW DIFFICULT HAVE THESE PROBLEMS MADE IT FOR YOU TO DO YOUR WORK, TAKE CARE OF THINGS AT HOME, OR GET ALONG WITH OTHER PEOPLE: NOT DIFFICULT AT ALL

## 2025-01-10 NOTE — PROGRESS NOTES
(and/or legal guardian's) consent. Patient identification was verified, and a caregiver was present when appropriate.   The patient was located at Home: [address]  [place] [address]  Provider was located at Home (Appt Dept State): [place]  Confirm you are appropriately licensed, registered, or certified to deliver care in the state where the patient is located as indicated above. If you are not or unsure, please re-schedule the visit: Yes, I confirm.        --Jessie Craig MD

## 2025-01-16 DIAGNOSIS — F41.0 PANIC ATTACKS: ICD-10-CM

## 2025-01-16 RX ORDER — ALPRAZOLAM 0.5 MG
0.5 TABLET ORAL NIGHTLY PRN
Qty: 30 TABLET | Refills: 0 | OUTPATIENT
Start: 2025-01-16 | End: 2025-02-15

## 2025-01-29 DIAGNOSIS — F41.0 PANIC ATTACKS: ICD-10-CM

## 2025-01-29 RX ORDER — ALPRAZOLAM 0.5 MG
0.5 TABLET ORAL NIGHTLY PRN
Qty: 30 TABLET | Refills: 0 | Status: CANCELLED | OUTPATIENT
Start: 2025-01-29 | End: 2025-02-28

## 2025-01-29 NOTE — TELEPHONE ENCOUNTER
Last visit was on 1-10-25, but no follow up schedule at this time. [facility] states it was last filled on 8-7-24.

## 2025-01-31 ENCOUNTER — PATIENT MESSAGE (OUTPATIENT)
Dept: FAMILY MEDICINE CLINIC | Facility: CLINIC | Age: 50
End: 2025-01-31

## 2025-01-31 DIAGNOSIS — F41.0 PANIC ATTACKS: ICD-10-CM

## 2025-01-31 RX ORDER — ALPRAZOLAM 0.5 MG
0.5 TABLET ORAL NIGHTLY PRN
Qty: 30 TABLET | Refills: 0 | Status: SHIPPED | OUTPATIENT
Start: 2025-01-31 | End: 2025-03-02